# Patient Record
Sex: MALE | Race: WHITE | URBAN - METROPOLITAN AREA
[De-identification: names, ages, dates, MRNs, and addresses within clinical notes are randomized per-mention and may not be internally consistent; named-entity substitution may affect disease eponyms.]

---

## 2020-12-28 ENCOUNTER — NEW PATIENT (OUTPATIENT)
Dept: URBAN - METROPOLITAN AREA CLINIC 44 | Facility: CLINIC | Age: 35
End: 2020-12-28
Payer: COMMERCIAL

## 2020-12-28 DIAGNOSIS — H52.223 REGULAR ASTIGMATISM, BILATERAL: Primary | ICD-10-CM

## 2020-12-28 PROCEDURE — 92004 COMPRE OPH EXAM NEW PT 1/>: CPT | Performed by: OPTOMETRIST

## 2020-12-28 PROCEDURE — 92015 DETERMINE REFRACTIVE STATE: CPT | Performed by: OPTOMETRIST

## 2020-12-28 ASSESSMENT — INTRAOCULAR PRESSURE
OD: 25
OS: 19
OS: 25
OD: 20

## 2020-12-28 ASSESSMENT — VISUAL ACUITY
OS: 20/20
OD: 20/20

## 2023-05-08 ENCOUNTER — OFFICE VISIT (OUTPATIENT)
Dept: URBAN - METROPOLITAN AREA CLINIC 26 | Facility: CLINIC | Age: 38
End: 2023-05-08
Payer: COMMERCIAL

## 2023-05-08 DIAGNOSIS — H52.223 REGULAR ASTIGMATISM, BILATERAL: Primary | ICD-10-CM

## 2023-05-08 DIAGNOSIS — H04.123 DRY EYE SYNDROME OF BILATERAL LACRIMAL GLANDS: ICD-10-CM

## 2023-05-08 PROCEDURE — 92012 INTRM OPH EXAM EST PATIENT: CPT | Performed by: OPTOMETRIST

## 2023-05-08 ASSESSMENT — INTRAOCULAR PRESSURE
OD: 18
OS: 19

## 2023-05-08 ASSESSMENT — VISUAL ACUITY
OD: 20/20
OS: 20/20

## 2023-05-08 NOTE — IMPRESSION/PLAN
Impression: Dry eye syndrome of bilateral lacrimal glands: H04.123.  Plan: Recommend art tears as needed OU. (sample given today)

## 2024-10-31 ENCOUNTER — OFFICE VISIT (OUTPATIENT)
Dept: URBAN - METROPOLITAN AREA CLINIC 24 | Facility: CLINIC | Age: 39
End: 2024-10-31
Payer: COMMERCIAL

## 2024-10-31 DIAGNOSIS — H52.223 REGULAR ASTIGMATISM, BILATERAL: ICD-10-CM

## 2024-10-31 DIAGNOSIS — H40.053 OCULAR HYPERTENSION, BILATERAL: Primary | ICD-10-CM

## 2024-10-31 PROCEDURE — 92133 CPTRZD OPH DX IMG PST SGM ON: CPT | Performed by: OPTOMETRIST

## 2024-10-31 PROCEDURE — 76514 ECHO EXAM OF EYE THICKNESS: CPT | Performed by: OPTOMETRIST

## 2024-10-31 PROCEDURE — 92014 COMPRE OPH EXAM EST PT 1/>: CPT | Performed by: OPTOMETRIST

## 2024-10-31 ASSESSMENT — INTRAOCULAR PRESSURE
OD: 22
OS: 22

## 2024-10-31 ASSESSMENT — VISUAL ACUITY
OD: 20/20
OS: 20/20

## 2024-10-31 ASSESSMENT — KERATOMETRY
OD: 44.85
OS: 45.17